# Patient Record
(demographics unavailable — no encounter records)

---

## 2024-12-16 NOTE — PHYSICAL EXAM
[Chaperone Present] : A chaperone was present in the examining room during all aspects of the physical examination [de-identified] : evidence of pelvic floor dysfunction.  [FreeTextEntry2] : Dede LAKHANI

## 2024-12-16 NOTE — HISTORY OF PRESENT ILLNESS
[FreeTextEntry1] : 2024 -- 35 F  presenting to establish new care. Hx UTIs, frequency, nocturia.  Reports urinary frequency that is worse when lying down at night. It is clear to her that the bladder is waking her up. Reports baseline frequency during the day.  Reports nocturia even without infection- worse after sex.   Admits h/o UTIs, reports 3-4 UTIs over the past 6-12 months. Reports was hospitalized at [facility] over summer with drug induced meningitis, (secondary to bactrim) given to her for UTI so highly motivated to avoid UTI's and abx.  She had kidney imaging at that time- negative for stones.  Denies PMH/FHx renal stones. Reports recent IVF- had pelvic symptoms for a while after.    PVR: 0cc (done to rule out incomplete bladder emptying)   PMH: denies  PSH: denies  FH: bladder ca (cousin)	 SocH: non-smoker, rare alcohol  Meds: IUD  Allergies: sulfa

## 2024-12-16 NOTE — ADDENDUM
[FreeTextEntry1] : A portion of this note was written by [Kd Powell] on 12/11/2024 acting as a scribe for Dr. Silva.   I have personally reviewed the chart and agree that the record accurately reflects my personal performance of the history, physical exam, assessment, and plan.

## 2024-12-16 NOTE — ASSESSMENT
[FreeTextEntry1] : I discussed the findings and options with Ms. TOMÁS HO in detail.   1. I reviewed the generic recommendations for UTI prevention with Ms. HO in detail, including: wiping front to back, increasing fluid intake. She will incorporate these and understands that she should have a urine culture prior to being treated with any courses of antibiotics.   - Start Ellura daily and an additional dose after sex.  - Urine sent for UA UC.   2. The patient and I discussed what the pelvic floor is and what pelvic floor dysfunction is.   - For now, we agreed to proceed with pelvic floor physical therapy and the appropriate referral was provided. We discussed the benefits of this approach, which would involve 6-8 weeks of once or twice per week therapy.  F/u in 3-6 months after PFPT. Patient expressed understanding.    The total amount of time I have personally spent preparing for this visit, reviewing the patient's test results, obtaining external history, ordering tests/medications, documenting clinical information, communicating with and counseling the patient/family and/or caregiver(s), reviewing old records, and spent face to face with the patient explaining the above was 45 minutes.

## 2024-12-16 NOTE — PHYSICAL EXAM
[Chaperone Present] : A chaperone was present in the examining room during all aspects of the physical examination [de-identified] : evidence of pelvic floor dysfunction.  [FreeTextEntry2] : Dede LAKHANI

## 2024-12-16 NOTE — HISTORY OF PRESENT ILLNESS
[FreeTextEntry1] : 2024 -- 35 F  presenting to establish new care. Hx UTIs, frequency, nocturia.  Reports urinary frequency that is worse when lying down at night. It is clear to her that the bladder is waking her up. Reports baseline frequency during the day.  Reports nocturia even without infection- worse after sex.   Admits h/o UTIs, reports 3-4 UTIs over the past 6-12 months. Reports was hospitalized at [facility] over summer with drug induced meningitis, (secondary to bactrim) given to her for UTI so highly motivated to avoid UTI's and abx.  She had kidney imaging at that time- negative for stones.  Denies PMH/FHx renal stones. Reports recent IVF- had pelvic symptoms for a while after.    PVR: 0cc (done to rule out incomplete bladder emptying)   PMH: denies  PSH: denies  FH: bladder ca (cousin)	 SocH: non-smoker, rare alcohol  Meds: IUD  Allergies: sulfa

## 2025-06-26 NOTE — HISTORY OF PRESENT ILLNESS
[Home] : at home, [unfilled] , at the time of the visit. [Medical Office: ([facility])___] : at the medical office located in  [Telehealth (audio & video)] : This visit was provided via telehealth using real-time 2-way audio visual technology. [FreeTextEntry1] : 2025 -- 35 F  with hx UTIs, frequency, nocturia.    Admits h/o ureaplasma. Reports urinary symptoms including intermittent frequency and dysuria, with flare-ups occurring every couple of weeks.  Patient reports partner tested positive for Ureaplasma and is undergoing antibiotic therapy. Partner initiated abx 1 week ago as per pt.  Patient dropped off urine sample 25. UC negative 25.   Ureaplasma/Mycoplasma report pending.   Of note, pt attempting pregnancy this summer.      2024 -- 35 F  presenting to establish new care. Hx UTIs, frequency, nocturia.  Reports urinary frequency that is worse when lying down at night. It is clear to her that the bladder is waking her up. Reports baseline frequency during the day.  Reports nocturia even without infection- worse after sex.   Admits h/o UTIs, reports 3-4 UTIs over the past 6-12 months. Reports was hospitalized at [facility] over summer with drug induced meningitis, (secondary to bactrim) given to her for UTI so highly motivated to avoid UTI's and abx.  She had kidney imaging at that time- negative for stones.  Denies PMH/FHx renal stones. Reports recent IVF- had pelvic symptoms for a while after.    PVR: 0cc (done to rule out incomplete bladder emptying)   PMH: denies  PSH: denies  FH: bladder ca (cousin)	 SocH: non-smoker, rare alcohol  Meds: IUD  Allergies: sulfa

## 2025-06-26 NOTE — ADDENDUM
[FreeTextEntry1] : A portion of this note was written by [Kd Powell] on 06/24/2025 acting as a scribe for Dr. Silva.   I have personally reviewed the chart and agree that the record accurately reflects my personal performance of the history, physical exam, assessment, and plan.

## 2025-06-26 NOTE — HISTORY OF PRESENT ILLNESS
[Home] : at home, [unfilled] , at the time of the visit. [Medical Office: ([facility])___] : at the medical office located in  [Telehealth (audio & video)] : This visit was provided via telehealth using real-time 2-way audio visual technology. [FreeTextEntry1] : 2025 -- 35 F  with hx UTIs, frequency, nocturia.    Admits h/o ureaplasma. Reports urinary symptoms including intermittent frequency and dysuria, with flare-ups occurring every couple of weeks.  Patient reports partner tested positive for Ureaplasma and is undergoing antibiotic therapy. Partner initiated abx 1 week ago as per pt.  Patient dropped off urine sample 25. UC negative 25.   Ureaplasma/Mycoplasma report pending.   Of note, pt attempting pregnancy this summer.      2024 -- 35 F  presenting to establish new care. Hx UTIs, frequency, nocturia.  Reports urinary frequency that is worse when lying down at night. It is clear to her that the bladder is waking her up. Reports baseline frequency during the day.  Reports nocturia even without infection- worse after sex.   Admits h/o UTIs, reports 3-4 UTIs over the past 6-12 months. Reports was hospitalized at [facility] over summer with drug induced meningitis, (secondary to bactrim) given to her for UTI so highly motivated to avoid UTI's and abx.  She had kidney imaging at that time- negative for stones.  Denies PMH/FHx renal stones. Reports recent IVF- had pelvic symptoms for a while after.    PVR: 0cc (done to rule out incomplete bladder emptying)   PMH: denies  PSH: denies  FH: bladder ca (cousin)	 SocH: non-smoker, rare alcohol  Meds: IUD  Allergies: sulfa

## 2025-06-26 NOTE — ASSESSMENT
[FreeTextEntry1] : I discussed the findings and options with Ms. TOMÁS HO in detail.  Reviewed UC negative 6/18/25.  Ureaplasma/Mycoplasma report pending.  Discussed treatment plan for Ureaplasma/Mycoplasma with patient, including medication (e.g., doxycycline, azithromycin, or fluoroquinolones), duration of treatment, and importance of completing the full course. - Doxycycline sent to pharmacy.   Patient will plan to call the office on Friday to discuss results (Ureaplasma/Mycoplasma).  Patient expressed understanding.     20 min spent on this encounter.